# Patient Record
Sex: MALE
[De-identification: names, ages, dates, MRNs, and addresses within clinical notes are randomized per-mention and may not be internally consistent; named-entity substitution may affect disease eponyms.]

---

## 2023-06-19 ENCOUNTER — HOME CARE VISIT (OUTPATIENT)
Dept: SCHEDULING | Facility: HOME HEALTH | Age: 68
End: 2023-06-19
Payer: MEDICARE

## 2023-06-19 ENCOUNTER — HOME HEALTH ADMISSION (OUTPATIENT)
Dept: HOME HEALTH SERVICES | Facility: HOME HEALTH | Age: 68
End: 2023-06-19
Payer: MEDICARE

## 2023-06-19 VITALS
SYSTOLIC BLOOD PRESSURE: 148 MMHG | HEART RATE: 91 BPM | DIASTOLIC BLOOD PRESSURE: 68 MMHG | OXYGEN SATURATION: 95 % | RESPIRATION RATE: 16 BRPM | TEMPERATURE: 97.6 F

## 2023-06-19 PROCEDURE — G0299 HHS/HOSPICE OF RN EA 15 MIN: HCPCS

## 2023-06-19 ASSESSMENT — ENCOUNTER SYMPTOMS: DYSPNEA ACTIVITY LEVEL: AFTER AMBULATING 10 - 20 FT

## 2023-06-19 NOTE — HOME HEALTH
Patient is a 69 year y/o/** . Pt presented to ED 5/21/23 with GI pain and Shob. Pt was found to be in Afib, admitted, transferred to Rehab, returned home 6/17/23. Pt has been recommended for mitral valve clip. Procedure on hold as with improvement in function pt may be a surgical candiadte for MVR. Past medical history of HTN, Parkinson's disease, arthritis, constipation. Patient lives in one story condo by himself. He has a family friend whom checks on him daily. Patient alert & oriented x 4, VS stable, denies any new open wounds or rashes, denies any falls since returning home. Pt has follow up apt with PCP scheduled. All questions/concerns addressed, will continue to [facility]. POC approval received from PCP. Caregiver involvement:family friend    Medications reconciled and all medications are available. Home health supplies by type and quantity ordered/delivered this visit include: NA    Patient education provided this visit: SN educated patient and patient's caregiver on Admission process, Call us first, Patient and patient caregiver verbalizes understanding via the teach back method.     Progress toward goals: [facility] today     Home exercise program: continue as ordered    Plan for next visit: medication/disease teaching/management    The following discharge planning was discussed with the patient/ patient's caregiver: DC when goals met

## 2023-06-20 ENCOUNTER — HOME CARE VISIT (OUTPATIENT)
Dept: SCHEDULING | Facility: HOME HEALTH | Age: 68
End: 2023-06-20
Payer: MEDICARE

## 2023-06-20 VITALS
TEMPERATURE: 97.6 F | DIASTOLIC BLOOD PRESSURE: 88 MMHG | HEART RATE: 81 BPM | SYSTOLIC BLOOD PRESSURE: 147 MMHG | RESPIRATION RATE: 18 BRPM | OXYGEN SATURATION: 96 %

## 2023-06-20 PROCEDURE — G0151 HHCP-SERV OF PT,EA 15 MIN: HCPCS

## 2023-06-20 NOTE — HOME HEALTH
Pt is an 70 yo male s/p recent hospitalization due to increasing SOB and fatigue. Pt found to have AFib and MV prolapse. Pt was transferred to rehab prior to returning to [facility]. Skilled PT intervention orders received. PMH includes: Parkinson's disease, HTN, AFib. Pt lives alone at [address] [facility] [facility] [address]. Pt was independent and very active without an AD. Pt uses lyft/uber for grocery shopping, medical appointments and errands. Facility nursing staff assists with med mangement and meals. Pt's significant other lives close by and able to assist as needed. Pt presents now showing impaired BLE strength scoring 3+/5, unsteady gait pattern, impaired dynamic balance scoring 18/28 on the Tinetti test and decreased cv endurance scoring 3/10 on the modified Emil scale. Pt demonstrates difficulty performing safe functional transfers, standing activities, household ambulation and facility ambulation without assistance as able to do in PLOF. Pt will benefit from skilled PT intervention in order to improve functional deficits and establish a HEP to allow pt to perform daily functional tasks independently using least restrictive AD for support. PT reviewed POC, tx frequency, tx goals, safety precautions, fall prevention strategies, safe med management and energy conservation techniques.

## 2023-06-21 ENCOUNTER — HOME CARE VISIT (OUTPATIENT)
Dept: SCHEDULING | Facility: HOME HEALTH | Age: 68
End: 2023-06-21
Payer: MEDICARE

## 2023-06-21 VITALS
HEART RATE: 83 BPM | DIASTOLIC BLOOD PRESSURE: 90 MMHG | TEMPERATURE: 97 F | SYSTOLIC BLOOD PRESSURE: 166 MMHG | RESPIRATION RATE: 18 BRPM | OXYGEN SATURATION: 98 %

## 2023-06-21 PROCEDURE — G0152 HHCP-SERV OF OT,EA 15 MIN: HCPCS
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
## 2023-06-22 NOTE — HOME HEALTH
The patient  missed a routine visit for SN on 6/21/23 due to pt had an appointment with PCP.  The physician office was not contacted via phone but the MISSED VISIT note is being sent via fax/mail and is recorded in the MEDIA tab.

## 2023-06-23 ENCOUNTER — HOME CARE VISIT (OUTPATIENT)
Dept: HOME HEALTH SERVICES | Facility: HOME HEALTH | Age: 68
End: 2023-06-23
Payer: MEDICARE

## 2023-06-26 ENCOUNTER — HOME CARE VISIT (OUTPATIENT)
Dept: HOME HEALTH SERVICES | Facility: HOME HEALTH | Age: 68
End: 2023-06-26
Payer: MEDICARE

## 2023-06-27 ENCOUNTER — HOME CARE VISIT (OUTPATIENT)
Dept: HOME HEALTH SERVICES | Facility: HOME HEALTH | Age: 68
End: 2023-06-27
Payer: MEDICARE

## 2023-08-21 ENCOUNTER — HOME HEALTH ADMISSION (OUTPATIENT)
Dept: HOME HEALTH SERVICES | Facility: HOME HEALTH | Age: 68
End: 2023-08-21

## 2023-09-01 ENCOUNTER — HOME HEALTH ADMISSION (OUTPATIENT)
Dept: HOME HEALTH SERVICES | Facility: HOME HEALTH | Age: 68
End: 2023-09-01
Payer: MEDICARE

## 2023-09-05 ENCOUNTER — HOME CARE VISIT (OUTPATIENT)
Dept: SCHEDULING | Facility: HOME HEALTH | Age: 68
End: 2023-09-05
Payer: MEDICARE

## 2023-09-05 PROCEDURE — G0299 HHS/HOSPICE OF RN EA 15 MIN: HCPCS

## 2023-09-06 ENCOUNTER — HOME CARE VISIT (OUTPATIENT)
Dept: SCHEDULING | Facility: HOME HEALTH | Age: 68
End: 2023-09-06
Payer: MEDICARE

## 2023-09-06 VITALS
TEMPERATURE: 97 F | OXYGEN SATURATION: 97 % | HEART RATE: 65 BPM | DIASTOLIC BLOOD PRESSURE: 69 MMHG | RESPIRATION RATE: 18 BRPM | SYSTOLIC BLOOD PRESSURE: 114 MMHG

## 2023-09-06 VITALS
OXYGEN SATURATION: 97 % | DIASTOLIC BLOOD PRESSURE: 69 MMHG | RESPIRATION RATE: 18 BRPM | HEART RATE: 66 BPM | SYSTOLIC BLOOD PRESSURE: 119 MMHG | TEMPERATURE: 97.4 F

## 2023-09-06 PROCEDURE — G0152 HHCP-SERV OF OT,EA 15 MIN: HCPCS

## 2023-09-06 PROCEDURE — G0151 HHCP-SERV OF PT,EA 15 MIN: HCPCS

## 2023-09-06 NOTE — HOME HEALTH
This patient has answered NO to the following questions:   1) have you traveled outside the country   2) have you been exposed to or been in contact with anyone whom traveled outside the country in the past two weeks   3) do you have a sore throat/fever/dry cough      4)The patient has been educated on and demonstrates good understanding via the teach-back method for the following: Signs and symptoms of COVID-19 yes    Pt is an 78year old male who lives in [facility] with 24 hour caregiver . Pt had a fall , followed with rehab and returned home 9/7/23. Pt ambulates in villa with no assistive device often losing balance and holding onto wall or furniture, ambulates very fast with decreased safety. Pt performs functional transfers with min assist and mod VC after teach back method for safe technique, keeping feet apart and not crossing feet with transfers, and slowing down. Pt demo's bilat UE strength 4/5 for transfers. Pt ambulates to dining room with cane with min assist for safety and VC for safety. Pt requires min assist for ADLs with decreased safety and standing balance,  demo's decresaed standing tolerance/balance requiring frequent rest periods with ADL's and  and UE support at all times to prevent falls. Pt would benefit with OT to increase safety and indep with ADL's, increase bilat UE strength for functional transfers, adaptive equipment recommendations and training, increasing standing tolerance/balance for safety with ADL's with fewer rest periods. Pt's goal is to return to PLOF and not require caregivers, remain at [facility].    Discussed DC plan  with pt and caregiver, plan to DC when goals met

## 2023-09-07 VITALS
SYSTOLIC BLOOD PRESSURE: 124 MMHG | HEART RATE: 77 BPM | DIASTOLIC BLOOD PRESSURE: 70 MMHG | TEMPERATURE: 97.5 F | RESPIRATION RATE: 16 BRPM | OXYGEN SATURATION: 97 %

## 2023-09-07 ASSESSMENT — ENCOUNTER SYMPTOMS: DYSPNEA ACTIVITY LEVEL: AFTER AMBULATING 10 - 20 FT

## 2023-09-07 NOTE — HOME HEALTH
Patient is a  69 year y/o/m. Pt lives in [facility] with 24/7 caregiver. Pt had a fall, followed with rehab and returned home 9/7/23. Past medical history of HTN, hypokalemia, falls, major depressive disorder, parkinsons, Afib. Patient alert & oriented x 3, VS stable, denies any new open wounds or rashes, denies any falls since returning home. Pt has follow up apt with PCP scheduled. All questions/concerns addressed, will continue to [facility]. POC approval received from PCP. Caregiver involvement: paid caregiver    Medications reconciled and all medications are available. Home health supplies by type and quantity ordered/delivered this visit include: NA    Patient education provided this visit: SN educated patient and patient's caregiver on Admission process, Call us first, Patient and patient caregiver verbalizes understanding via the teach back method.     Progress toward goals: [facility] today     Home exercise program: continue as ordered    Plan for next visit: medication/disease teaching/management    The following discharge planning was discussed with the patient/ patient's caregiver: DC when goals met

## 2023-09-07 NOTE — HOME HEALTH
Pt is an 70 yo male s/p fall resulting in hospitalization. Pt was transferred to rehab before returning home. Skilled PT intervention orders received. PMH includes: Parkinson's disease, Fallsmild cognitive impairment. Pt lives at [address] [place] [address] in a [address] [place] [address]. Pt now has a private caregiver daily for 24 hours. Pt was not using an AD and was able to perform ADL's independently. Pt presents now showing impaired BLE strength scoring 3+/5 affecting his ability to transfer safely without assistance, impaired dynamic balance scoring 18/28 on the Tinetti test increasing his risk for falls during ambulation, impaired gait pattern demonstrating wide CARMENZA-kyphotic posture-increased B knee flex-increased hip flex affecting safety during ambulation and standing activities. Pt demonstrates difficulty performing safe functional transfers, standing activities, household ambulation and community ambulation without assistance as able to do in PLOF. Pt is now using a SC for community distances. Pt appeared easily agitated during evaluation showing decreased insight to functional deficits, pt was also upset that he now has caregivers. Pt will benefit from skilled PT intervention in order to improve functional deficits and establish a HEP to allow pt to perform daily functional tasks independently using his sc for support. Pt is homebound secondary to requiring assistance from his caregivers to leave home safely, requires an AD for support due to impaired balance and is at high risk for falls due to muscle weakness and impaired gait pattern. PT reviewed POC, tx frequency, tx goals, safety precautions, fall prevention strategies, safe med management and energy conservation techniques.

## 2023-09-11 ENCOUNTER — HOME CARE VISIT (OUTPATIENT)
Dept: HOME HEALTH SERVICES | Facility: HOME HEALTH | Age: 68
End: 2023-09-11
Payer: MEDICARE

## 2023-09-12 ENCOUNTER — HOME CARE VISIT (OUTPATIENT)
Dept: SCHEDULING | Facility: HOME HEALTH | Age: 68
End: 2023-09-12
Payer: MEDICARE

## 2023-09-12 VITALS — TEMPERATURE: 97 F | HEART RATE: 67 BPM | SYSTOLIC BLOOD PRESSURE: 133 MMHG | DIASTOLIC BLOOD PRESSURE: 78 MMHG

## 2023-09-12 VITALS
DIASTOLIC BLOOD PRESSURE: 80 MMHG | HEART RATE: 66 BPM | RESPIRATION RATE: 18 BRPM | TEMPERATURE: 97.2 F | OXYGEN SATURATION: 97 % | SYSTOLIC BLOOD PRESSURE: 140 MMHG

## 2023-09-12 PROCEDURE — G0152 HHCP-SERV OF OT,EA 15 MIN: HCPCS

## 2023-09-12 PROCEDURE — G0157 HHC PT ASSISTANT EA 15: HCPCS

## 2023-09-12 ASSESSMENT — ENCOUNTER SYMPTOMS: PAIN LOCATION - PAIN QUALITY: ACHE

## 2023-09-12 NOTE — HOME HEALTH
This patient has answered NO to the following questions:   1) have you traveled outside the country   2) have you been exposed to or been in contact with anyone whom traveled outside the country in the past two weeks   3) do you have a sore throat/fever/dry cough      4)The patient has been educated on and demonstrates good understanding via the teach-back method for the following: Signs and symptoms of COVID-19 yes    No reported changes or incidents since previous visit  Pt upset regarding why he needs therapy and also caregiver, feels he needs none of it. Instructed pt in safe mobility in home and recommending removing throw rug in bathroom, mod VC after teach back method for slowing down ambulation and transfers,. Instructed in safe sitting in computer chair with wheels to stabilize it against table, recommended dressing in safe stable chair but pt prefers sitting on EOB or standing. Instructed in ambulating with hands out of pockets to have UE available for support if needed. Instructed pt in bilat UE strengthening in unsupported sitting to increase core strength for sitting ADLs, posture exercises and deep breathing. Pt tolerates 10-15 reps to shoulders, elbows,   in unsupported sitting to increase core and trunk control for sitting ADL's. Pt requires mod VC for proper form, breathing, pacing and slowing exercises down after teach back method  Discussed DC plan  with pt and caregiver, plan to DC when goals met  Plan to continue increasing UE strength, ADL's, safety with transfers.   Pt progressing well towards OT goals

## 2023-09-13 ENCOUNTER — HOME CARE VISIT (OUTPATIENT)
Dept: SCHEDULING | Facility: HOME HEALTH | Age: 68
End: 2023-09-13
Payer: MEDICARE

## 2023-09-13 VITALS
OXYGEN SATURATION: 97 % | DIASTOLIC BLOOD PRESSURE: 70 MMHG | HEART RATE: 64 BPM | RESPIRATION RATE: 18 BRPM | SYSTOLIC BLOOD PRESSURE: 120 MMHG | TEMPERATURE: 96.8 F

## 2023-09-13 PROCEDURE — G0157 HHC PT ASSISTANT EA 15: HCPCS

## 2023-09-13 NOTE — HOME HEALTH
Pt states he is sleeping well at night but when he wakes up he is very groggy and his back feels stiff sometimes but it is minimal. He feels stiff but after he walks for a little bit he loosens up and feels better. Gait training within pt home without AD as pt states he amb in home without AD; 300ft for warm up and focus on improving pt gait quality. Presents with decrease arm swing, forward head posture, decrease stance knee extension. VC for forward gaze, upright posture, and increasing terminal knee extension to improve gait quality. Instructed pt in self stretching to improve muscle flexibility for carryover to exercises and gait training. Instructed in standing with back against wall, heels touching wall, and focus on trunk extension to get scapula on wall to stretch postural muscles, 15sec x 3. Hamstring stretching with pt seated and instructed on proping BLE onto coffee table and allowing knees to extend for prolonged stretch. Instructed in ankle dorsiflexion to further stretch gastrocs also. 30sec x 3. Supine exercises to improve strength, decrease stiffness and back pain and educated to complete every morning due to more stiffness in the mornings. 10reps lower trunk rotations, single knee to chest, SLR with VC for maintaining knee extension however presents with quad lag, ankle pumps, and bridging. Balance training as follows for prevention of falls: Unsupported; lateral stepping x 10steps to each side, alternating heel taps x 10reps, alternating punches with visual tracking of therapist hands as target in varying heights and ranges with focus on rotational movements and dynamic stability. Min unsteadiness with balance but no LOB. Pt able to tolerate all given tasks however postural muscles and hamstrings are tight which is increasing stiff movements and decrease in safety with gait as pt has more of a crouched posture.  Plan: Continue to stretch to improve muscle length for carryover to

## 2023-09-14 ENCOUNTER — HOME CARE VISIT (OUTPATIENT)
Dept: SCHEDULING | Facility: HOME HEALTH | Age: 68
End: 2023-09-14
Payer: MEDICARE

## 2023-09-14 PROCEDURE — G0299 HHS/HOSPICE OF RN EA 15 MIN: HCPCS

## 2023-09-14 NOTE — HOME HEALTH
Pt states he has a walker, WC, and cane but does not use any of it because he does not need it. He takes his cane with him when walking outside but doesnt use it. He feels he has no deficits and doing fine. No reports of pain. Gait training with FWW, walker adjusted to appropriate height and educated pt on importance of using walker to improve stability as pt is unsteady. Pt states he does not need it but agreeable to amb within home with it during this session. Amb 300ft with SBA and VC for closer proximity, ensuring elbows are relaxed at sides. Prior to gait training observed pt amb and unsteadiness with pt swaying. Standing stretch with pt back against the wall and BUE on walker; pushing into extension and focus on getting head and shoulders close to wall to stretch pec area for carryover to more upright posture with gait training. Standing exercises 10reps x 2 to improve muscle strength and endurance for transfers; BUE supported on walker; heel raises, hip extension with VC to decrese trunk flexion, hip flexion, hip abd, and mini squats with VC for proper form and maintaining knees behind toes. Balance training: to improve stabilty for fall prevention. Instructed in figure 8 amb with cones on the ground utilizine FWW; alternating foot taps on cones with B hand suport to 1 hand support and to no hands 10reps each, reaching for cones in high and low ranges and outside of CARMENZA. Min unsteadiness and cues for slowing down for foot taps on cones to improve safety. Pt presents to be unaware of his deficits in balance stability. unsteady with amb and presents to do better with FWW showing better foot clearance and posture. Left walker in room and encouraged pt to use. Fall risk due to more forward posture and balance deficits. Plan: Stretching to decrease foward posture, gait training unlevel surface to improve abiltiy and safety to get to facility gym, safety with use of AD for prevention of falls.  DC when

## 2023-09-17 VITALS
RESPIRATION RATE: 18 BRPM | HEART RATE: 68 BPM | SYSTOLIC BLOOD PRESSURE: 140 MMHG | OXYGEN SATURATION: 96 % | TEMPERATURE: 97.7 F | DIASTOLIC BLOOD PRESSURE: 78 MMHG

## 2023-09-18 ENCOUNTER — HOME CARE VISIT (OUTPATIENT)
Dept: SCHEDULING | Facility: HOME HEALTH | Age: 68
End: 2023-09-18
Payer: MEDICARE

## 2023-09-18 VITALS
TEMPERATURE: 97 F | OXYGEN SATURATION: 97 % | DIASTOLIC BLOOD PRESSURE: 85 MMHG | SYSTOLIC BLOOD PRESSURE: 140 MMHG | RESPIRATION RATE: 18 BRPM

## 2023-09-18 VITALS
OXYGEN SATURATION: 98 % | TEMPERATURE: 97.3 F | SYSTOLIC BLOOD PRESSURE: 140 MMHG | HEART RATE: 72 BPM | DIASTOLIC BLOOD PRESSURE: 85 MMHG

## 2023-09-18 PROCEDURE — G0152 HHCP-SERV OF OT,EA 15 MIN: HCPCS

## 2023-09-18 PROCEDURE — G2168 SVS BY PT IN HOME HEALTH: HCPCS

## 2023-09-18 NOTE — HOME HEALTH
This patient has answered NO to the following questions:   1) have you traveled outside the country   2) have you been exposed to or been in contact with anyone whom traveled outside the country in the past two weeks   3) do you have a sore throat/fever/dry cough      4)The patient has been educated on and demonstrates good understanding via the teach-back method for the following: Signs and symptoms of COVID-19 yes    No reported changes or incidents since previous visit    Pt seen for OT DC today. Pt insructed in bilat UE strenghening, posture exercises, core strengthening and trunk control to assist with ADL's, transfers. Instructed pt in energy conservation , body mechanics, work simplification with ADL's to reduce fatigue and risk for falls, adaptive equipment recommendations and training  Pt indep with HEP and met all goals, receptive to OT DC. Pt goes to the facility fitness room and taylor a circuit with caregiver, pt ambulates with cane to fitness center but mostly just holds onto it vs uses for assistance.

## 2023-09-18 NOTE — HOME HEALTH
Pt states he tries to go walking every morning. He likes to stay active. Caregiver states that it is hard to get pt to agree to take showers and is having some strong smell to pt urine. Amb to and from facility gym with SBA and focus on improving safety. amb unlevel surface with single cane 500ft x 2, forward posture and decrease in knee extension with VC for correction to deviations. Worked on safety within facility gym to improve safety and ensuring proper use of equipment. 10min on tredmil; 2.3 speed and focus on endurance, posture, and safe speed. Pt did not require any cues for foot clearance. Therex to strengthen LE and improve endurance; 15reps x 3 utilizing machine; hamstring curls, leg extensions, leg press with rest period after each set. VC for completing in full range and focus on pt safety with getting on and off machines. Stretching to improve flexibility for exercising; 30sec x 3 postural stretch with pt heels against the wall and focus on trunk and cervical extension for stretching anterior muscles. Seated hamstring stretch with BLE resting on therapy ball and instructing pt to reach forward with hands for a hold while maintaining knee extension. Pt showing improved endurance with increase gait distance and ability to tolerate all exercise machines. Educated pt on proper care of hygiene for health and to take a shower after exercise; pt states he is ok and doesn't need to shower. After encouragement pt was able to agree and say that he will shower. Plan: improving safety and independence with gait, improve flexibility, and improve safety. DC when goals met.

## 2023-09-20 ENCOUNTER — HOME CARE VISIT (OUTPATIENT)
Dept: SCHEDULING | Facility: HOME HEALTH | Age: 68
End: 2023-09-20
Payer: MEDICARE

## 2023-09-20 VITALS
RESPIRATION RATE: 18 BRPM | HEART RATE: 67 BPM | TEMPERATURE: 97.3 F | DIASTOLIC BLOOD PRESSURE: 72 MMHG | OXYGEN SATURATION: 98 % | SYSTOLIC BLOOD PRESSURE: 126 MMHG

## 2023-09-20 PROCEDURE — G2168 SVS BY PT IN HOME HEALTH: HCPCS

## 2023-09-21 NOTE — HOME HEALTH
Pt states he is going to get evaluated soon to see if he will need to go into [facility]. States he has been trying to do some type of physical task every day but knows he sometimes over trains and states he needs to be careful. Gait training with pt unlevel surface utilizing cane; 1,500ft navigating up and down curbs and inclines with focus on improving pt endurance and safety with navigating along sea wall and for ability to safely navigate to facility gym and back. Pt tendency to carry his cane with no contact on ground and VC given to utilize cane to improve stability. Educated pt is to use his cane at all times to improve safety. Pt verbalized understanding and able to demo proper use with gait training this session. Pt tends to ambs quickly with forward posture and needing cues for slower pacing however able to show continuous foot clearance. Balance training to decrease falls and improve stability; Shoulder width and NBOS 30sec with eyes opened, 5sec with eyes closed and pt with mod swaying at 3sec. Instructed in cervical rotations and extension x 3reps. VC for upright posture and core activation to stabilize. Educated pt on safety with fall prevention such as putting night light in bathroom and gamez ways as pt does wake up in the middle of the night to use the toilet which are both situated in the gamez way. Discussed possibly keeping gamez way light on during the evening time. Pt verbalized understanding through teach back and states he will try to keep it on tonight. Due to balance deficits and especially swaying with eyes closed, pt is at increased risk for falls during the night as pt does not have any night lights currently in place however pt was in agreement with teachings and presents to be willing to keep gamez light on.  Able to reach transfer and gait goals with amb unlevel surface however needs encouragement and education on importance of continued and conssitent use of cane for pt safety and

## 2023-09-22 ENCOUNTER — HOME CARE VISIT (OUTPATIENT)
Dept: SCHEDULING | Facility: HOME HEALTH | Age: 68
End: 2023-09-22
Payer: MEDICARE

## 2023-09-25 ENCOUNTER — HOME CARE VISIT (OUTPATIENT)
Dept: SCHEDULING | Facility: HOME HEALTH | Age: 68
End: 2023-09-25
Payer: MEDICARE

## 2023-09-25 VITALS
SYSTOLIC BLOOD PRESSURE: 128 MMHG | TEMPERATURE: 98.4 F | HEART RATE: 82 BPM | OXYGEN SATURATION: 97 % | RESPIRATION RATE: 18 BRPM | DIASTOLIC BLOOD PRESSURE: 65 MMHG

## 2023-09-25 PROCEDURE — G0151 HHCP-SERV OF PT,EA 15 MIN: HCPCS

## 2023-09-26 NOTE — HOME HEALTH
Pt has shown inconsistent progress toward POC, reaching max potential with PT services. Pt presents with improved BLE strength scoring a 4-/5 on MMT,  improved dynamic balance scoring a 21/28 on Tinetti test and increased cv endurance scoring 2/10 on modified Emil test allowing him to perform facility ambulation including within his villa, short distance community ambulation, functional transfers from various surfaces and standing activities with decreased difficulty. Pt able to perform all functional tasks safely with SBA. Pt does have a NBQC but does not use it consistently despite frequent education at each visit. Pt continues to have private caregiver 24/7. Pt shows poor carryover and understanding to all instructions through the teach back method secondary to poor insight to functional deficits, poor participation during ts sessions and agitation toward therapy. PT reviewed HEP, safety precautions, fall prevention techniques, energy conservation techniques, safe med management and proper body mechanics. Pt continues to be very active with daily activities walking frequently outside and going to the facility gym with the superviaion of his private caregiver. Will dc PT services.

## 2023-09-28 ENCOUNTER — HOME CARE VISIT (OUTPATIENT)
Dept: SCHEDULING | Facility: HOME HEALTH | Age: 68
End: 2023-09-28
Payer: MEDICARE

## 2023-09-28 PROCEDURE — G0299 HHS/HOSPICE OF RN EA 15 MIN: HCPCS

## 2023-09-29 VITALS
DIASTOLIC BLOOD PRESSURE: 78 MMHG | HEART RATE: 69 BPM | TEMPERATURE: 98.5 F | RESPIRATION RATE: 18 BRPM | OXYGEN SATURATION: 96 % | SYSTOLIC BLOOD PRESSURE: 138 MMHG

## 2023-09-29 ASSESSMENT — ENCOUNTER SYMPTOMS
CONSTIPATION: 1
STOOL DESCRIPTION: SMALL

## 2023-12-01 ENCOUNTER — HOME HEALTH ADMISSION (OUTPATIENT)
Dept: HOME HEALTH SERVICES | Facility: HOME HEALTH | Age: 68
End: 2023-12-01

## 2023-12-29 ENCOUNTER — HOME HEALTH ADMISSION (OUTPATIENT)
Dept: HOME HEALTH SERVICES | Facility: HOME HEALTH | Age: 68
End: 2023-12-29
Payer: MEDICARE

## 2024-01-02 ENCOUNTER — HOME CARE VISIT (OUTPATIENT)
Dept: SCHEDULING | Facility: HOME HEALTH | Age: 69
End: 2024-01-02
Payer: MEDICARE

## 2024-01-02 VITALS
TEMPERATURE: 97.2 F | OXYGEN SATURATION: 96 % | RESPIRATION RATE: 18 BRPM | HEART RATE: 61 BPM | SYSTOLIC BLOOD PRESSURE: 134 MMHG | DIASTOLIC BLOOD PRESSURE: 82 MMHG

## 2024-01-02 PROCEDURE — G0151 HHCP-SERV OF PT,EA 15 MIN: HCPCS

## 2024-01-02 ASSESSMENT — ENCOUNTER SYMPTOMS
DYSPNEA ACTIVITY LEVEL: AFTER AMBULATING MORE THAN 20 FT
PAIN LOCATION - PAIN QUALITY: ACHE

## 2024-01-02 NOTE — HOME HEALTH
Pt is an 67 yo male who presents with worsening cervical pain that is due to fall back in Oct 2022. Skilled PT intervention orders received. PMH includes: Parkinson's disease, falls, OA knee, ORIF LUE, MVR.    Pt lives at [facility]. Pt has a private caregivers 12 hours daily, 7 days a week. Pt has a sc that he is supposed to be using but is forgetfull about using it consistently. Pt requires assistance from caregivers for light housekeeping, laundry, grocery shopping, errands and ADL's. Pt's care managers are available to assist as needed also and drive him to all appointments. Pt presents now showing continued increased pain in his cervical spine that affects his sleeping and ROM, impaired BLE strength scoring 3+/5 affecting his ability to perform sit-stand transfers safely without assistance especially from his low couch where pt uses his coffee table to pull up from, impaired dynamic balance scoring 19/28 on the Tinetti test affecting safe ambulation especially ovr uneven surfaces and decreased cv endurance scoring 4/10 on the modified Emil scale showing fatigue with exertion. Pt is very active and goes to the facility gym several days a week. Pt does show BUE resting tremors, wide CARMENZA during ambulation with forward lean and festinating steps.    Pt will benefit from skilled PT intervention in order to improve functional deficits, reduce cervical spine pain and establish a HEP to allow pt to perform daily functional tasks independently using an appropriate AD for support. Pt is homebound secondary to requiring assistance from his caregivers to leave home safely, requires an AD for support due to impaired balance and is at high risk for falls due to muscle weakness.  PT reviewed POC, tx frequency, tx goals, safety precautions, fall prevention strategies, safe med management including high risk meds, proper body mechanics, pain management and energy conservation techniques.

## 2024-01-04 ENCOUNTER — HOME CARE VISIT (OUTPATIENT)
Dept: SCHEDULING | Facility: HOME HEALTH | Age: 69
End: 2024-01-04
Payer: MEDICARE

## 2024-01-04 PROCEDURE — G0157 HHC PT ASSISTANT EA 15: HCPCS

## 2024-01-07 VITALS — HEART RATE: 64 BPM | OXYGEN SATURATION: 99 % | SYSTOLIC BLOOD PRESSURE: 126 MMHG | DIASTOLIC BLOOD PRESSURE: 70 MMHG

## 2024-01-10 ENCOUNTER — HOME CARE VISIT (OUTPATIENT)
Dept: SCHEDULING | Facility: HOME HEALTH | Age: 69
End: 2024-01-10
Payer: MEDICARE

## 2024-01-10 PROCEDURE — G0157 HHC PT ASSISTANT EA 15: HCPCS

## 2024-01-12 ENCOUNTER — HOME CARE VISIT (OUTPATIENT)
Dept: HOME HEALTH SERVICES | Facility: HOME HEALTH | Age: 69
End: 2024-01-12
Payer: MEDICARE

## 2024-01-13 VITALS — SYSTOLIC BLOOD PRESSURE: 144 MMHG | DIASTOLIC BLOOD PRESSURE: 78 MMHG | OXYGEN SATURATION: 96 % | HEART RATE: 63 BPM

## 2024-01-17 ENCOUNTER — HOME CARE VISIT (OUTPATIENT)
Dept: SCHEDULING | Facility: HOME HEALTH | Age: 69
End: 2024-01-17
Payer: MEDICARE

## 2024-01-17 PROCEDURE — G0157 HHC PT ASSISTANT EA 15: HCPCS

## 2024-01-19 ENCOUNTER — HOME CARE VISIT (OUTPATIENT)
Dept: SCHEDULING | Facility: HOME HEALTH | Age: 69
End: 2024-01-19
Payer: MEDICARE

## 2024-01-21 VITALS — SYSTOLIC BLOOD PRESSURE: 126 MMHG | DIASTOLIC BLOOD PRESSURE: 78 MMHG | HEART RATE: 68 BPM

## 2024-01-22 ENCOUNTER — HOME CARE VISIT (OUTPATIENT)
Dept: SCHEDULING | Facility: HOME HEALTH | Age: 69
End: 2024-01-22
Payer: MEDICARE

## 2024-01-22 VITALS
HEART RATE: 82 BPM | TEMPERATURE: 97.2 F | SYSTOLIC BLOOD PRESSURE: 127 MMHG | DIASTOLIC BLOOD PRESSURE: 64 MMHG | RESPIRATION RATE: 18 BRPM

## 2024-01-22 PROCEDURE — G0151 HHCP-SERV OF PT,EA 15 MIN: HCPCS

## 2024-01-22 NOTE — HOME HEALTH
pt had chiropractor apt- did not return   The physician office was not contacted via phone but the MISSED VISIT note is being sent via fax/mail and is recorded in the MEDIA tab.

## 2024-01-22 NOTE — HOME HEALTH
Pt was at [facility]  The physician office was not contacted via phone but the MISSED VISIT note is being sent via fax/mail and is recorded in the MEDIA tab.

## 2024-01-23 NOTE — HOME HEALTH
Pt has shown steady progress toward POC, partially meeting goals established. Pt demonstrates improving BLE strength allowing him to perform sit-stand transfers without difficulty from multiple surfaces with 1 attempt, improving dynamic balance allowing him to ambulate throughout his villa safely without LOB or using his sc, decreased complaints of cervical spine pain/discomfort and improving endurance allowing him to ambulate throughout the [facility] facility with reduced fatigue. Pt continues to be very active daily and continues to have private caregivers throughout the day more for supervision. Pt reports not using his sc inside his villa and only uses it inconsistently for community distances. PT reviewed remaining frequency and goals and will continue to work on his cervical spine ex and overall safety awareness.

## 2024-02-02 ENCOUNTER — HOME CARE VISIT (OUTPATIENT)
Dept: SCHEDULING | Facility: HOME HEALTH | Age: 69
End: 2024-02-02
Payer: MEDICARE

## 2024-02-02 PROCEDURE — G0157 HHC PT ASSISTANT EA 15: HCPCS

## 2024-02-04 VITALS — HEART RATE: 70 BPM | OXYGEN SATURATION: 96 %

## 2024-02-05 ENCOUNTER — HOME CARE VISIT (OUTPATIENT)
Dept: SCHEDULING | Facility: HOME HEALTH | Age: 69
End: 2024-02-05
Payer: MEDICARE

## 2024-02-05 VITALS
RESPIRATION RATE: 18 BRPM | HEART RATE: 70 BPM | TEMPERATURE: 97.8 F | DIASTOLIC BLOOD PRESSURE: 74 MMHG | OXYGEN SATURATION: 96 % | SYSTOLIC BLOOD PRESSURE: 123 MMHG

## 2024-02-05 PROCEDURE — G0151 HHCP-SERV OF PT,EA 15 MIN: HCPCS

## 2024-02-05 ASSESSMENT — ENCOUNTER SYMPTOMS: PAIN LOCATION - PAIN QUALITY: ACHY

## 2024-02-05 NOTE — HOME HEALTH
Pt has shown good progress toward POC, meeting all goals stated. Pt presents with improved BLE strength scoring a 4/5 on MMT,  improved dynamic balance scoring a 22/28 on Tinetti test and increased cv endurance scoring 2/10 on modified Emil test allowing him to perform facility ambulation including within his villa, short distance community ambulation, functional transfers from various surfaces and standing activities without difficulty. Pt able to perform all functional tasks safely with MI/supervision. Pt has a sc that he uses inconsistently. Pt shows good carryover and understanding to all instructions through the teach back method.  PT reviewed HEP, safety precautions, fall prevention techniques, energy conservation techniques, safe med management and proper body mechanics. Pt continues to be very active with daily activities, will dc PT services.